# Patient Record
Sex: FEMALE | Race: WHITE | NOT HISPANIC OR LATINO
[De-identification: names, ages, dates, MRNs, and addresses within clinical notes are randomized per-mention and may not be internally consistent; named-entity substitution may affect disease eponyms.]

---

## 2018-10-20 PROBLEM — Z00.00 ENCOUNTER FOR PREVENTIVE HEALTH EXAMINATION: Status: ACTIVE | Noted: 2018-10-20

## 2018-11-20 ENCOUNTER — NON-APPOINTMENT (OUTPATIENT)
Age: 53
End: 2018-11-20

## 2018-11-20 ENCOUNTER — APPOINTMENT (OUTPATIENT)
Dept: HEART AND VASCULAR | Facility: CLINIC | Age: 53
End: 2018-11-20
Payer: COMMERCIAL

## 2018-11-20 VITALS
DIASTOLIC BLOOD PRESSURE: 70 MMHG | HEART RATE: 78 BPM | BODY MASS INDEX: 23.79 KG/M2 | WEIGHT: 118 LBS | HEIGHT: 59 IN | SYSTOLIC BLOOD PRESSURE: 120 MMHG

## 2018-11-20 DIAGNOSIS — Z82.49 FAMILY HISTORY OF ISCHEMIC HEART DISEASE AND OTHER DISEASES OF THE CIRCULATORY SYSTEM: ICD-10-CM

## 2018-11-20 PROCEDURE — 93000 ELECTROCARDIOGRAM COMPLETE: CPT

## 2018-11-20 PROCEDURE — 99215 OFFICE O/P EST HI 40 MIN: CPT

## 2018-11-20 RX ORDER — BUPROPION HYDROCHLORIDE 150 MG/1
150 TABLET, FILM COATED, EXTENDED RELEASE ORAL
Refills: 0 | Status: ACTIVE | COMMUNITY

## 2018-11-20 RX ORDER — THYROID, PORCINE 30 MG/1
30 TABLET ORAL
Refills: 0 | Status: ACTIVE | COMMUNITY

## 2018-11-20 RX ORDER — DASATINIB 100 MG/1
100 TABLET ORAL
Refills: 0 | Status: ACTIVE | COMMUNITY

## 2018-11-20 RX ORDER — ASPIRIN 81 MG/1
81 TABLET ORAL
Refills: 0 | Status: ACTIVE | COMMUNITY

## 2018-11-20 NOTE — PHYSICAL EXAM
[General Appearance - Well Developed] : well developed [Normal Appearance] : normal appearance [Well Groomed] : well groomed [General Appearance - Well Nourished] : well nourished [No Deformities] : no deformities [General Appearance - In No Acute Distress] : no acute distress [Normal Conjunctiva] : the conjunctiva exhibited no abnormalities [Eyelids - No Xanthelasma] : the eyelids demonstrated no xanthelasmas [Normal Oral Mucosa] : normal oral mucosa [No Oral Pallor] : no oral pallor [No Oral Cyanosis] : no oral cyanosis [JVD Elevated _____cm] : JVD elevated [unfilled] ~U cm above clavicle [Normal Jugular Venous V Waves Present] : normal jugular venous V waves present [Heart Rate And Rhythm] : heart rate and rhythm were normal [Heart Sounds] : normal S1 and S2 [Murmurs] : no murmurs present [Arterial Pulses Normal] : the arterial pulses were normal [Edema] : no peripheral edema present [Respiration, Rhythm And Depth] : normal respiratory rhythm and effort [Exaggerated Use Of Accessory Muscles For Inspiration] : no accessory muscle use [Auscultation Breath Sounds / Voice Sounds] : lungs were clear to auscultation bilaterally [Abdomen Soft] : soft [Abdomen Tenderness] : non-tender [Abdomen Mass (___ Cm)] : no abdominal mass palpated [Abnormal Walk] : normal gait [Gait - Sufficient For Exercise Testing] : the gait was sufficient for exercise testing [Nail Clubbing] : no clubbing of the fingernails [Cyanosis, Localized] : no localized cyanosis [Petechial Hemorrhages (___cm)] : no petechial hemorrhages [Skin Color & Pigmentation] : normal skin color and pigmentation [] : no rash [No Venous Stasis] : no venous stasis [Skin Lesions] : no skin lesions [No Skin Ulcers] : no skin ulcer [No Xanthoma] : no  xanthoma was observed [Oriented To Time, Place, And Person] : oriented to person, place, and time [Affect] : the affect was normal [Mood] : the mood was normal [No Anxiety] : not feeling anxious

## 2018-11-21 LAB
CHOLEST SERPL-MCNC: 148 MG/DL
CHOLEST/HDLC SERPL: 2 RATIO
HDLC SERPL-MCNC: 74 MG/DL
LDLC SERPL CALC-MCNC: 59 MG/DL
TRIGL SERPL-MCNC: 74 MG/DL

## 2018-11-23 LAB — HBA1C MFR BLD HPLC: 5.3 %

## 2018-11-23 NOTE — DISCUSSION/SUMMARY
[FreeTextEntry1] : Ms. Juárez is optimized from a risk standpoint considering her baseline risk factors. Lipids are optimal, but Lpa sent on [employer] assay which is more accurate than the one she provides. \par \par Urged to continue her current healthy lifestyle, have children screened for Lpa as well. \par She will be going off of Sprycel and closely monitoring for recurrent of leukemia. \par Return every 6 months to one year. \par

## 2018-11-23 NOTE — ASSESSMENT
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

## 2018-11-23 NOTE — HISTORY OF PRESENT ILLNESS
[FreeTextEntry1] : 53 year old woman with a history of HLD and HTN, in remission for leukemia (on Sprycel- immune theraoy), who presents for follow-up of CV risk factors.\par \par [facility] and is here to establish care at [facility]. \par \par She eats a very healthy diet, is very active with daily activities and exercises regularly. \par She is adherent with her current medication regimen and reports no side effects with it. \par \par lipids- chol- 162, trig- 76, HDL- 71, LDL- 76, Lpa 98\par \par kids have yet to be tested for lipids, but eat a relatively heart healthy diet and all very active

## 2018-11-27 LAB — APO LP(A) SERPL-MCNC: 179 NMOL/L

## 2018-12-31 ENCOUNTER — TRANSCRIPTION ENCOUNTER (OUTPATIENT)
Age: 53
End: 2018-12-31

## 2019-03-03 ENCOUNTER — TRANSCRIPTION ENCOUNTER (OUTPATIENT)
Age: 54
End: 2019-03-03

## 2019-03-25 ENCOUNTER — TRANSCRIPTION ENCOUNTER (OUTPATIENT)
Age: 54
End: 2019-03-25

## 2019-03-26 ENCOUNTER — TRANSCRIPTION ENCOUNTER (OUTPATIENT)
Age: 54
End: 2019-03-26

## 2019-04-05 ENCOUNTER — TRANSCRIPTION ENCOUNTER (OUTPATIENT)
Age: 54
End: 2019-04-05

## 2019-04-08 ENCOUNTER — RX RENEWAL (OUTPATIENT)
Age: 54
End: 2019-04-08

## 2019-04-08 ENCOUNTER — TRANSCRIPTION ENCOUNTER (OUTPATIENT)
Age: 54
End: 2019-04-08

## 2019-04-12 ENCOUNTER — TRANSCRIPTION ENCOUNTER (OUTPATIENT)
Age: 54
End: 2019-04-12

## 2019-04-15 ENCOUNTER — RX RENEWAL (OUTPATIENT)
Age: 54
End: 2019-04-15

## 2019-09-06 ENCOUNTER — RECORD ABSTRACTING (OUTPATIENT)
Age: 54
End: 2019-09-06

## 2020-02-07 ENCOUNTER — RX RENEWAL (OUTPATIENT)
Age: 55
End: 2020-02-07

## 2020-02-14 ENCOUNTER — RX RENEWAL (OUTPATIENT)
Age: 55
End: 2020-02-14

## 2020-05-05 ENCOUNTER — RX RENEWAL (OUTPATIENT)
Age: 55
End: 2020-05-05

## 2020-05-13 ENCOUNTER — RX RENEWAL (OUTPATIENT)
Age: 55
End: 2020-05-13

## 2020-07-27 ENCOUNTER — RX RENEWAL (OUTPATIENT)
Age: 55
End: 2020-07-27

## 2020-08-03 ENCOUNTER — RX RENEWAL (OUTPATIENT)
Age: 55
End: 2020-08-03

## 2020-09-28 ENCOUNTER — APPOINTMENT (OUTPATIENT)
Dept: HEART AND VASCULAR | Facility: CLINIC | Age: 55
End: 2020-09-28
Payer: COMMERCIAL

## 2020-09-28 ENCOUNTER — NON-APPOINTMENT (OUTPATIENT)
Age: 55
End: 2020-09-28

## 2020-09-28 VITALS
WEIGHT: 122 LBS | HEART RATE: 78 BPM | TEMPERATURE: 99.2 F | BODY MASS INDEX: 24.6 KG/M2 | HEIGHT: 59 IN | DIASTOLIC BLOOD PRESSURE: 70 MMHG | SYSTOLIC BLOOD PRESSURE: 120 MMHG

## 2020-09-28 DIAGNOSIS — C92.90: ICD-10-CM

## 2020-09-28 DIAGNOSIS — E03.9 HYPOTHYROIDISM, UNSPECIFIED: ICD-10-CM

## 2020-09-28 DIAGNOSIS — F33.8 OTHER RECURRENT DEPRESSIVE DISORDERS: ICD-10-CM

## 2020-09-28 PROCEDURE — 36415 COLL VENOUS BLD VENIPUNCTURE: CPT

## 2020-09-28 PROCEDURE — 99215 OFFICE O/P EST HI 40 MIN: CPT

## 2020-09-28 PROCEDURE — 93000 ELECTROCARDIOGRAM COMPLETE: CPT

## 2020-09-28 NOTE — DISCUSSION/SUMMARY
[FreeTextEntry1] : 53 year old woman (known to me in [facility]) with a history of HTN, HLD, hypothyroidism, Leukemia in remission (on Sprycel- immune therapy), season affective disorder who presents for follow-up. \par \par # HTN \par - Blood pressure at goal today \par - continue Lisinopril 20 mg daily \par \par # HLD \par - will get Lipid panel today \par - continue Crestor 10 mg daily \par \par # ASCVD risk assessment \par - 10-yr ASCVD risk 0.8% \par - Lpa 98 nmol/L \par - continue ASA daily \par - will repeat Echo in next visit \par - continue Mx as above \par \par - will get her CBC and CMP from her oncologist Dr. Franz ([facility]) \par - encourage to eat a healthier diet and continue her current exercise routine \par  \par - RCT om 6 mo\par

## 2020-09-28 NOTE — REVIEW OF SYSTEMS
[Fever] : no fever [Headache] : no headache [Chills] : no chills [Feeling Fatigued] : not feeling fatigued [Blurry Vision] : no blurred vision [Eyeglasses] : not currently wearing eyeglasses [Earache] : no earache [Mouth Sores] : no mouth sores [Shortness Of Breath] : no shortness of breath [Dyspnea on exertion] : not dyspnea during exertion [Chest Pain] : no chest pain [Lower Ext Edema] : no extremity edema [Palpitations] : no palpitations [Cough] : no cough [Abdominal Pain] : no abdominal pain [Nausea] : no nausea [Heartburn] : no heartburn [Change in Appetite] : no change in appetite [Dysphagia] : no dysphagia [Dysuria] : no dysuria [Incontinence] : no incontinence [Joint Pain] : no joint pain [Joint Swelling] : no joint swelling [Muscle Cramps] : no muscle cramps [Skin: A Rash] : no rash: [Skin Lesions] : no skin lesions [Dizziness] : no dizziness [Convulsions] : no convulsions [Confusion] : no confusion was observed [Anxiety] : no anxiety [Excessive Thirst] : no polydipsia [Easy Bleeding] : no tendency for easy bleeding [Easy Bruising] : no tendency for easy bruising [Negative] : Heme/Lymph

## 2020-09-28 NOTE — END OF VISIT
[] : Resident [FreeTextEntry3] : I was physically present for the key portions of the evaluation and management service provided. I agree with the above history, physical, and plan which I have reviewed and edited where appropriate. I have examined the patient and plan was discussed with the patient as well. \par \par \par

## 2020-09-28 NOTE — PHYSICAL EXAM
[General Appearance - Well Nourished] : well nourished [Normal Conjunctiva] : the conjunctiva exhibited no abnormalities [Normal Oropharynx] : normal oropharynx [Heart Rate And Rhythm] : heart rate and rhythm were normal [Heart Sounds] : normal S1 and S2 [Arterial Pulses Normal] : the arterial pulses were normal [Edema] : no peripheral edema present [Veins - Varicosity Changes] : no varicosital changes were noted in the lower extremities [Respiration, Rhythm And Depth] : normal respiratory rhythm and effort [Exaggerated Use Of Accessory Muscles For Inspiration] : no accessory muscle use [Auscultation Breath Sounds / Voice Sounds] : lungs were clear to auscultation bilaterally [Chest Palpation] : palpation of the chest revealed no abnormalities [Lungs Percussion] : the lungs were normal to percussion [Bowel Sounds] : normal bowel sounds [Abdomen Soft] : soft [Abdomen Tenderness] : non-tender [] : no hepato-splenomegaly [Abnormal Walk] : normal gait [Nail Clubbing] : no clubbing of the fingernails [Cyanosis, Localized] : no localized cyanosis [Petechial Hemorrhages (___cm)] : no petechial hemorrhages [Skin Color & Pigmentation] : normal skin color and pigmentation [Skin Turgor] : normal skin turgor [Oriented To Time, Place, And Person] : oriented to person, place, and time [Impaired Insight] : insight and judgment were intact [Affect] : the affect was normal [Mood] : the mood was normal [No Anxiety] : not feeling anxious [FreeTextEntry1] : 3/6 systolic murmur over L sternal border

## 2020-09-28 NOTE — REASON FOR VISIT
[Follow-Up - Clinic] : a clinic follow-up of [Hyperlipidemia] : hyperlipidemia [Hypertension] : hypertension [FreeTextEntry1] : 53 year old woman (known to me in [facility]) with a history of HTN, HLD, hypothyroidism, Leukemia in remission (on Sprycel- immune therapy), season affective disorder who presents for follow-up.

## 2020-09-28 NOTE — HISTORY OF PRESENT ILLNESS
[FreeTextEntry1] : 53 year old woman (known to me in [facility]) with a history of HTN, HLD, hypothyroidism, Leukemia in remission (on Sprycel- immune therapy), season affective disorder who presents for follow-up. \par \par She does not endorse any symptoms this time. She is adherent with her current medication regimen and reports no side effects with it. She denies any SOB on exertion, chest pain on exertion, leg swelling, orthopnea or PND. She states her diet is not healthier as before the covid. She states she has been exercising about 60 mins daily (stationary bike, running, strength training). She reports a few episodes of "skipping heart beats" last winter; she took the rhythm with her apple watch and sent it to her PCP and was told normal. \par \par She denies any smoking or drinking. \par \par lipids 11/20/2018 \par chol- 162, trig- 76, HDL- 71, LDL- 76, Lpa 98 nmol/L \par \par 3/23/18 Lpa = 98 mg/dL\par \par 10/2017 - CAC = 0; no coronary artery calcification \par \par 10/17/17 echo - LV and RV size and function are normal; LVEF 70%; no significant valvular disease; compared to TTE from 5/1/2013 there is no significant change.\par

## 2020-09-29 LAB
CHOLEST SERPL-MCNC: 154 MG/DL
CHOLEST/HDLC SERPL: 2.2 RATIO
HDLC SERPL-MCNC: 69 MG/DL
LDLC SERPL CALC-MCNC: 70 MG/DL
TRIGL SERPL-MCNC: 73 MG/DL

## 2021-06-23 ENCOUNTER — RX RENEWAL (OUTPATIENT)
Age: 56
End: 2021-06-23

## 2021-10-01 ENCOUNTER — NON-APPOINTMENT (OUTPATIENT)
Age: 56
End: 2021-10-01

## 2021-10-01 ENCOUNTER — APPOINTMENT (OUTPATIENT)
Dept: HEART AND VASCULAR | Facility: CLINIC | Age: 56
End: 2021-10-01
Payer: COMMERCIAL

## 2021-10-01 VITALS
TEMPERATURE: 98 F | DIASTOLIC BLOOD PRESSURE: 70 MMHG | SYSTOLIC BLOOD PRESSURE: 120 MMHG | BODY MASS INDEX: 23.79 KG/M2 | OXYGEN SATURATION: 97 % | HEART RATE: 86 BPM | HEIGHT: 59 IN | WEIGHT: 118 LBS

## 2021-10-01 PROCEDURE — 93000 ELECTROCARDIOGRAM COMPLETE: CPT

## 2021-10-01 PROCEDURE — 36415 COLL VENOUS BLD VENIPUNCTURE: CPT

## 2021-10-01 PROCEDURE — 99214 OFFICE O/P EST MOD 30 MIN: CPT | Mod: 25

## 2021-10-01 NOTE — REASON FOR VISIT
[FreeTextEntry1] : 56 year old woman (known to me in [facility]) with a history of HTN, HLD, hypothyroidism, Leukemia in remission (on Sprycel- immune therapy), season affective disorder who presents for follow-up.

## 2021-10-01 NOTE — HISTORY OF PRESENT ILLNESS
[FreeTextEntry1] : 56 year old woman (known to me in [facility]) with a history of HTN, HLD, hypothyroidism, Leukemia in remission (on Sprycel- immune therapy), season affective disorder who presents for follow-up. \par \par \par 3/23/18 Lpa = 98 mg/dL\par \par 10/2017 - CAC = 0; no coronary artery calcification \par \par 10/17/17 echo - LV and RV size and function are normal; LVEF 70%; no significant valvular disease; compared to TTE from 5/1/2013 there is no significant change.\par \par Needs repeat echo - insurance requires visit first.\par \par She reports feeling very well. Patient denies any chest pain, SOB, palpitations, orthopnea, PND or LE edema.\par \par She follows w/ oncologist regularly and has basic lab work -she will send to me. No recent lipids or A1c. She reports hyperkalemia - provider suggested she mention it to her cardiologist based on Lisinopril. She has been taking it for years and feels she had other isolated elevations, but likes the medication a lot. \par \par She is compliant w/ all her meds and tolerating them well. 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 She loves that it gets her outside more.

## 2021-10-01 NOTE — DISCUSSION/SUMMARY
[FreeTextEntry1] : 56 year old woman (known to me in [facility]) with a history of HTN, HLD, hypothyroidism, Leukemia in remission (on Sprycel- immune therapy), season affective disorder who presents for follow-up. \par \par HTN - BP well-controlled. Ordered BMP to recheck K+ as she reports her oncologist noted a slight elevation on most recent labs. She does not want to switch the Lisinopril if possible as she has been on it for years and feels it controls her BP very well. \par \par HLD - Ordered labs today to assess the efficacy of the current regimen. Will make adjustments as necessary depending on results. Encouraged patient to continue healthy exercise and eating habits, focusing on a Mediterranean style of eating w/ more plant based foods in general, and aiming for the recommended 150 minutes per week of moderate physical activity.\par \par Will order echo; will f/u w/ lab and echo results. \par \par Can f/u in one year.

## 2021-10-04 LAB
ANION GAP SERPL CALC-SCNC: 10 MMOL/L
BUN SERPL-MCNC: 23 MG/DL
CALCIUM SERPL-MCNC: 9.4 MG/DL
CHLORIDE SERPL-SCNC: 103 MMOL/L
CHOLEST SERPL-MCNC: 183 MG/DL
CO2 SERPL-SCNC: 27 MMOL/L
CREAT SERPL-MCNC: 0.91 MG/DL
ESTIMATED AVERAGE GLUCOSE: 114 MG/DL
GLUCOSE SERPL-MCNC: 115 MG/DL
HBA1C MFR BLD HPLC: 5.6 %
HDLC SERPL-MCNC: 75 MG/DL
LDLC SERPL CALC-MCNC: 88 MG/DL
NONHDLC SERPL-MCNC: 108 MG/DL
POTASSIUM SERPL-SCNC: 5.2 MMOL/L
SODIUM SERPL-SCNC: 140 MMOL/L
TRIGL SERPL-MCNC: 104 MG/DL

## 2021-10-29 ENCOUNTER — APPOINTMENT (OUTPATIENT)
Dept: HEART AND VASCULAR | Facility: CLINIC | Age: 56
End: 2021-10-29
Payer: COMMERCIAL

## 2021-10-29 VITALS
WEIGHT: 119 LBS | HEART RATE: 79 BPM | SYSTOLIC BLOOD PRESSURE: 126 MMHG | DIASTOLIC BLOOD PRESSURE: 65 MMHG | HEIGHT: 59 IN | TEMPERATURE: 98.5 F | BODY MASS INDEX: 23.99 KG/M2

## 2021-10-29 PROCEDURE — 93306 TTE W/DOPPLER COMPLETE: CPT

## 2021-12-08 ENCOUNTER — TRANSCRIPTION ENCOUNTER (OUTPATIENT)
Age: 56
End: 2021-12-08

## 2022-01-03 ENCOUNTER — NON-APPOINTMENT (OUTPATIENT)
Age: 57
End: 2022-01-03

## 2022-01-03 ENCOUNTER — APPOINTMENT (OUTPATIENT)
Dept: HEART AND VASCULAR | Facility: CLINIC | Age: 57
End: 2022-01-03
Payer: COMMERCIAL

## 2022-01-03 VITALS
TEMPERATURE: 98.6 F | DIASTOLIC BLOOD PRESSURE: 66 MMHG | SYSTOLIC BLOOD PRESSURE: 114 MMHG | WEIGHT: 118 LBS | BODY MASS INDEX: 23.79 KG/M2 | HEIGHT: 59 IN | HEART RATE: 85 BPM

## 2022-01-03 PROCEDURE — 93306 TTE W/DOPPLER COMPLETE: CPT

## 2022-01-04 LAB — NT-PROBNP SERPL-MCNC: 188 PG/ML

## 2022-05-26 ENCOUNTER — RX RENEWAL (OUTPATIENT)
Age: 57
End: 2022-05-26

## 2022-07-20 ENCOUNTER — RX RENEWAL (OUTPATIENT)
Age: 57
End: 2022-07-20

## 2023-04-28 ENCOUNTER — RX RENEWAL (OUTPATIENT)
Age: 58
End: 2023-04-28

## 2023-05-26 ENCOUNTER — APPOINTMENT (OUTPATIENT)
Dept: HEART AND VASCULAR | Facility: CLINIC | Age: 58
End: 2023-05-26
Payer: COMMERCIAL

## 2023-05-26 VITALS
HEIGHT: 59 IN | WEIGHT: 120 LBS | HEART RATE: 95 BPM | TEMPERATURE: 97.2 F | BODY MASS INDEX: 24.19 KG/M2 | SYSTOLIC BLOOD PRESSURE: 144 MMHG | OXYGEN SATURATION: 96 % | DIASTOLIC BLOOD PRESSURE: 77 MMHG

## 2023-05-26 DIAGNOSIS — E78.00 PURE HYPERCHOLESTEROLEMIA, UNSPECIFIED: ICD-10-CM

## 2023-05-26 DIAGNOSIS — I10 ESSENTIAL (PRIMARY) HYPERTENSION: ICD-10-CM

## 2023-05-26 PROCEDURE — 93000 ELECTROCARDIOGRAM COMPLETE: CPT

## 2023-05-26 PROCEDURE — 99214 OFFICE O/P EST MOD 30 MIN: CPT | Mod: 25

## 2023-05-26 NOTE — DISCUSSION/SUMMARY
[EKG obtained to assist in diagnosis and management of assessed problem(s)] : EKG obtained to assist in diagnosis and management of assessed problem(s) [FreeTextEntry1] : 57 year old woman (known to me in [facility]) with a history of HTN, HLD, hypothyroidism, Leukemia in remission (on Sprycel- immune therapy), season affective disorder who presents for follow-up. \par \par HTN: \par - will order repeat echo; previous \par - orders for repeat BMP after ACE increase and lipids given to pt to do when fasting \par - increase lisinopril to 40mg qd; if hyperkalemia is noted or pt doesn't tolerate, can consider going back to 20mg and adding amlodipine (may not be ideal w/ her immunotherapy as it is metabolized by CY)\par \par Will f/u after lab and echo results reviewed.

## 2023-05-26 NOTE — HISTORY OF PRESENT ILLNESS
[FreeTextEntry1] : 57 year old woman (known to me in [facility]) with a history of HTN, HLD, hypothyroidism, Leukemia in remission (on Sprycel- immune therapy), season affective disorder who presents for follow-up. \par \par \par She reports just having blood work done on Monday. No lipids. Everything was WNL. She has noticed her BP increasing recently. She has continued on lisinopril 20mg and was on HCTZ in the past, but felt increased fatigue that she feels resolved after stopping HCTZ, but realizes it may have been another cause.\par \par She continues to stay active, doing crossfit. Patient denies any chest pain, SOB, palpitations, orthopnea, PND or LE edema.\par \par

## 2023-06-19 ENCOUNTER — TRANSCRIPTION ENCOUNTER (OUTPATIENT)
Age: 58
End: 2023-06-19

## 2023-06-21 ENCOUNTER — TRANSCRIPTION ENCOUNTER (OUTPATIENT)
Age: 58
End: 2023-06-21

## 2023-06-23 ENCOUNTER — RX RENEWAL (OUTPATIENT)
Age: 58
End: 2023-06-23

## 2023-06-23 ENCOUNTER — TRANSCRIPTION ENCOUNTER (OUTPATIENT)
Age: 58
End: 2023-06-23

## 2023-07-05 ENCOUNTER — TRANSCRIPTION ENCOUNTER (OUTPATIENT)
Age: 58
End: 2023-07-05

## 2023-07-05 ENCOUNTER — APPOINTMENT (OUTPATIENT)
Dept: HEART AND VASCULAR | Facility: CLINIC | Age: 58
End: 2023-07-05
Payer: COMMERCIAL

## 2023-07-05 DIAGNOSIS — I31.39 OTHER PERICARDIAL EFFUSION (NONINFLAMMATORY): ICD-10-CM

## 2023-07-05 DIAGNOSIS — I07.1 RHEUMATIC TRICUSPID INSUFFICIENCY: ICD-10-CM

## 2023-07-05 PROCEDURE — 93306 TTE W/DOPPLER COMPLETE: CPT

## 2023-07-06 ENCOUNTER — TRANSCRIPTION ENCOUNTER (OUTPATIENT)
Age: 58
End: 2023-07-06

## 2023-07-07 ENCOUNTER — TRANSCRIPTION ENCOUNTER (OUTPATIENT)
Age: 58
End: 2023-07-07

## 2023-07-08 ENCOUNTER — TRANSCRIPTION ENCOUNTER (OUTPATIENT)
Age: 58
End: 2023-07-08

## 2023-07-08 PROBLEM — I07.1 TRICUSPID REGURGITATION: Status: ACTIVE | Noted: 2021-11-10

## 2023-07-08 PROBLEM — I31.39 PERICARDIAL EFFUSION WITHOUT CARDIAC TAMPONADE: Status: ACTIVE | Noted: 2021-11-10

## 2023-07-10 ENCOUNTER — TRANSCRIPTION ENCOUNTER (OUTPATIENT)
Age: 58
End: 2023-07-10

## 2023-07-11 ENCOUNTER — TRANSCRIPTION ENCOUNTER (OUTPATIENT)
Age: 58
End: 2023-07-11

## 2024-03-26 ENCOUNTER — TRANSCRIPTION ENCOUNTER (OUTPATIENT)
Age: 59
End: 2024-03-26

## 2024-03-27 ENCOUNTER — TRANSCRIPTION ENCOUNTER (OUTPATIENT)
Age: 59
End: 2024-03-27

## 2024-05-08 ENCOUNTER — TRANSCRIPTION ENCOUNTER (OUTPATIENT)
Age: 59
End: 2024-05-08

## 2024-05-08 RX ORDER — LISINOPRIL 40 MG/1
40 TABLET ORAL DAILY
Qty: 90 | Refills: 3 | Status: ACTIVE | COMMUNITY
Start: 2020-02-07 | End: 1900-01-01

## 2024-05-08 RX ORDER — ROSUVASTATIN CALCIUM 20 MG/1
20 TABLET, FILM COATED ORAL
Qty: 90 | Refills: 3 | Status: ACTIVE | COMMUNITY
Start: 2020-02-14 | End: 1900-01-01

## 2024-05-09 ENCOUNTER — TRANSCRIPTION ENCOUNTER (OUTPATIENT)
Age: 59
End: 2024-05-09

## 2024-10-11 ENCOUNTER — APPOINTMENT (OUTPATIENT)
Dept: HEART AND VASCULAR | Facility: CLINIC | Age: 59
End: 2024-10-11

## 2025-03-07 ENCOUNTER — NON-APPOINTMENT (OUTPATIENT)
Age: 60
End: 2025-03-07

## 2025-03-12 ENCOUNTER — NON-APPOINTMENT (OUTPATIENT)
Age: 60
End: 2025-03-12

## 2025-03-12 ENCOUNTER — APPOINTMENT (OUTPATIENT)
Dept: HEART AND VASCULAR | Facility: CLINIC | Age: 60
End: 2025-03-12
Payer: COMMERCIAL

## 2025-03-12 VITALS
DIASTOLIC BLOOD PRESSURE: 76 MMHG | WEIGHT: 118 LBS | HEART RATE: 72 BPM | SYSTOLIC BLOOD PRESSURE: 128 MMHG | OXYGEN SATURATION: 98 % | HEIGHT: 59 IN | TEMPERATURE: 97.7 F | BODY MASS INDEX: 23.79 KG/M2

## 2025-03-12 DIAGNOSIS — I10 ESSENTIAL (PRIMARY) HYPERTENSION: ICD-10-CM

## 2025-03-12 DIAGNOSIS — I31.39 OTHER PERICARDIAL EFFUSION (NONINFLAMMATORY): ICD-10-CM

## 2025-03-12 DIAGNOSIS — E78.00 PURE HYPERCHOLESTEROLEMIA, UNSPECIFIED: ICD-10-CM

## 2025-03-12 PROCEDURE — 93000 ELECTROCARDIOGRAM COMPLETE: CPT

## 2025-03-12 PROCEDURE — 36415 COLL VENOUS BLD VENIPUNCTURE: CPT

## 2025-03-12 PROCEDURE — 99214 OFFICE O/P EST MOD 30 MIN: CPT

## 2025-03-13 ENCOUNTER — TRANSCRIPTION ENCOUNTER (OUTPATIENT)
Age: 60
End: 2025-03-13

## 2025-03-13 LAB
ALBUMIN SERPL ELPH-MCNC: 4.4 G/DL
ALP BLD-CCNC: 51 U/L
ALT SERPL-CCNC: 20 U/L
ANION GAP SERPL CALC-SCNC: 9 MMOL/L
AST SERPL-CCNC: 22 U/L
BILIRUB SERPL-MCNC: 0.5 MG/DL
BUN SERPL-MCNC: 19 MG/DL
CALCIUM SERPL-MCNC: 9.3 MG/DL
CHLORIDE SERPL-SCNC: 106 MMOL/L
CHOLEST SERPL-MCNC: 149 MG/DL
CO2 SERPL-SCNC: 26 MMOL/L
CREAT SERPL-MCNC: 1.01 MG/DL
EGFRCR SERPLBLD CKD-EPI 2021: 65 ML/MIN/1.73M2
GLUCOSE SERPL-MCNC: 102 MG/DL
HCT VFR BLD CALC: 36.8 %
HDLC SERPL-MCNC: 72 MG/DL
HGB BLD-MCNC: 12.3 G/DL
LDLC SERPL CALC-MCNC: 66 MG/DL
NONHDLC SERPL-MCNC: 77 MG/DL
POTASSIUM SERPL-SCNC: 5.2 MMOL/L
PROT SERPL-MCNC: 6.3 G/DL
SODIUM SERPL-SCNC: 140 MMOL/L
TRIGL SERPL-MCNC: 52 MG/DL

## 2025-08-11 ENCOUNTER — APPOINTMENT (OUTPATIENT)
Dept: HEART AND VASCULAR | Facility: CLINIC | Age: 60
End: 2025-08-11
Payer: COMMERCIAL

## 2025-08-11 VITALS
HEART RATE: 77 BPM | WEIGHT: 115 LBS | BODY MASS INDEX: 23.18 KG/M2 | DIASTOLIC BLOOD PRESSURE: 68 MMHG | OXYGEN SATURATION: 97 % | TEMPERATURE: 97.8 F | SYSTOLIC BLOOD PRESSURE: 126 MMHG | HEIGHT: 59 IN

## 2025-08-11 DIAGNOSIS — I10 ESSENTIAL (PRIMARY) HYPERTENSION: ICD-10-CM

## 2025-08-11 DIAGNOSIS — I07.1 RHEUMATIC TRICUSPID INSUFFICIENCY: ICD-10-CM

## 2025-08-11 DIAGNOSIS — I31.39 OTHER PERICARDIAL EFFUSION (NONINFLAMMATORY): ICD-10-CM

## 2025-08-11 PROCEDURE — 93306 TTE W/DOPPLER COMPLETE: CPT

## 2025-08-11 PROCEDURE — 93000 ELECTROCARDIOGRAM COMPLETE: CPT

## 2025-08-11 PROCEDURE — 99204 OFFICE O/P NEW MOD 45 MIN: CPT | Mod: 25

## 2025-09-19 ENCOUNTER — APPOINTMENT (OUTPATIENT)
Dept: HEART AND VASCULAR | Facility: CLINIC | Age: 60
End: 2025-09-19